# Patient Record
Sex: MALE | Race: WHITE | NOT HISPANIC OR LATINO | Employment: FULL TIME | URBAN - METROPOLITAN AREA
[De-identification: names, ages, dates, MRNs, and addresses within clinical notes are randomized per-mention and may not be internally consistent; named-entity substitution may affect disease eponyms.]

---

## 2017-01-06 ENCOUNTER — TELEPHONE (OUTPATIENT)
Dept: FAMILY MEDICINE | Facility: CLINIC | Age: 47
End: 2017-01-06

## 2017-01-06 NOTE — TELEPHONE ENCOUNTER
Panel Management Review      Patient has the following on his problem list:     Hypertension   Last three blood pressure readings:  BP Readings from Last 3 Encounters:   10/14/16 143/88   10/10/16 126/82   02/25/16 133/76     Blood pressure: Failed    HTN Guidelines:  Age 18-59 BP range:  Less than 140/90  Age 60-85 with Diabetes:  Less than 140/90  Age 60-85 without Diabetes:  less than 150/90      Composite cancer screening  Chart review shows that this patient is due/due soon for the following None  Summary:    Patient is due/failing the following:   BP CHECK    Action needed:   Patient needs nurse only appointment.    Type of outreach:    Sent [employer] message.    Questions for provider review:    None                                                                                   Martha Dexter CMA

## 2017-10-12 ENCOUNTER — TELEPHONE (OUTPATIENT)
Dept: FAMILY MEDICINE | Facility: CLINIC | Age: 47
End: 2017-10-12

## 2017-10-12 NOTE — TELEPHONE ENCOUNTER
Panel Management Review      Patient has the following on his problem list:     Hypertension   Last three blood pressure readings:  BP Readings from Last 3 Encounters:   10/14/16 143/88   10/10/16 126/82   02/25/16 133/76     Blood pressure: FAILED    HTN Guidelines:  Age 18-59 BP range:  Less than 140/90  Age 60-85 with Diabetes:  Less than 140/90  Age 60-85 without Diabetes:  less than 150/90        Composite cancer screening  Chart review shows that this patient is due/due soon for the following None  Summary:    Patient is due/failing the following:   BP CHECK and PHYSICAL    Action needed:   Patient needs office visit for PE and or med check.    Type of outreach:    Sent [employer] message. and Sent letter.    Questions for provider review:    None                                                                                                                                    Martha Dexter, CMA

## 2017-10-12 NOTE — LETTER
October 12, 2017      Mahendra Dixon  [address] [place] [address]      Dear Sony,    In order to ensure we are providing the best quality care, Dr. Ross and I have reviewed your chart and see that you are due for a blood pressure check and or yearly physical    Please call the clinic to set up an office visit at [phone] or [phone].    We greatly appreciate the opportunity to serve you. Thank you for trusting us with your health care.    Sincerely,    ELVIS Douglas M.D.

## 2017-11-15 DIAGNOSIS — I10 BENIGN ESSENTIAL HYPERTENSION: ICD-10-CM

## 2017-11-15 RX ORDER — LISINOPRIL/HYDROCHLOROTHIAZIDE 10-12.5 MG
1 TABLET ORAL DAILY
Qty: 90 TABLET | Refills: 3 | OUTPATIENT
Start: 2017-11-15

## 2018-10-12 ENCOUNTER — RECORDS - HEALTHEAST (OUTPATIENT)
Dept: LAB | Facility: CLINIC | Age: 48
End: 2018-10-12

## 2018-10-12 LAB
ALBUMIN SERPL-MCNC: 4.4 G/DL (ref 3.5–5)
ALP SERPL-CCNC: 90 U/L (ref 45–120)
ALT SERPL W P-5'-P-CCNC: 30 U/L (ref 0–45)
ANION GAP SERPL CALCULATED.3IONS-SCNC: 12 MMOL/L (ref 5–18)
AST SERPL W P-5'-P-CCNC: 23 U/L (ref 0–40)
BILIRUB SERPL-MCNC: 0.5 MG/DL (ref 0–1)
BUN SERPL-MCNC: 16 MG/DL (ref 8–22)
C REACTIVE PROTEIN LHE: 0.3 MG/DL (ref 0–0.8)
CALCIUM SERPL-MCNC: 10.1 MG/DL (ref 8.5–10.5)
CHLORIDE BLD-SCNC: 101 MMOL/L (ref 98–107)
CO2 SERPL-SCNC: 28 MMOL/L (ref 22–31)
CREAT SERPL-MCNC: 0.98 MG/DL (ref 0.7–1.3)
ERYTHROCYTE [SEDIMENTATION RATE] IN BLOOD BY WESTERGREN METHOD: 8 MM/HR (ref 0–15)
GFR SERPL CREATININE-BSD FRML MDRD: >60 ML/MIN/1.73M2
GLUCOSE BLD-MCNC: 81 MG/DL (ref 70–125)
POTASSIUM BLD-SCNC: 4.4 MMOL/L (ref 3.5–5)
PROT SERPL-MCNC: 7.3 G/DL (ref 6–8)
PSA SERPL-MCNC: 0.3 NG/ML (ref 0–2.5)
SODIUM SERPL-SCNC: 141 MMOL/L (ref 136–145)
TSH SERPL DL<=0.005 MIU/L-ACNC: 4.92 UIU/ML (ref 0.3–5)
VIT B12 SERPL-MCNC: 548 PG/ML (ref 213–816)

## 2018-10-13 LAB — BACTERIA SPEC CULT: NO GROWTH

## 2018-11-02 ENCOUNTER — RECORDS - HEALTHEAST (OUTPATIENT)
Dept: LAB | Facility: CLINIC | Age: 48
End: 2018-11-02

## 2018-11-09 LAB
B BURGDOR AB SER-IMP: NORMAL
LYME AB IGG BAND(S): NORMAL
LYME AB IGM BAND(S): NORMAL
LYME IGG BLOT: NEGATIVE
LYME IGM BLOT: NEGATIVE

## 2018-11-26 ENCOUNTER — OFFICE VISIT - HEALTHEAST (OUTPATIENT)
Dept: PODIATRY | Facility: CLINIC | Age: 48
End: 2018-11-26

## 2018-11-26 DIAGNOSIS — G57.61 MORTON'S NEUROMA OF RIGHT FOOT: ICD-10-CM

## 2018-11-26 ASSESSMENT — MIFFLIN-ST. JEOR: SCORE: 1961.69

## 2018-11-29 ENCOUNTER — COMMUNICATION - HEALTHEAST (OUTPATIENT)
Dept: OTHER | Facility: CLINIC | Age: 48
End: 2018-11-29

## 2018-12-19 ENCOUNTER — COMMUNICATION - HEALTHEAST (OUTPATIENT)
Dept: OTHER | Facility: CLINIC | Age: 48
End: 2018-12-19

## 2018-12-26 ENCOUNTER — COMMUNICATION - HEALTHEAST (OUTPATIENT)
Dept: ADMINISTRATIVE | Facility: CLINIC | Age: 48
End: 2018-12-26

## 2019-01-11 ENCOUNTER — RECORDS - HEALTHEAST (OUTPATIENT)
Dept: LAB | Facility: CLINIC | Age: 49
End: 2019-01-11

## 2019-01-11 LAB
ALBUMIN SERPL-MCNC: 4.1 G/DL (ref 3.5–5)
ALP SERPL-CCNC: 81 U/L (ref 45–120)
ALT SERPL W P-5'-P-CCNC: 35 U/L (ref 0–45)
AST SERPL W P-5'-P-CCNC: 23 U/L (ref 0–40)
BILIRUB DIRECT SERPL-MCNC: 0.2 MG/DL
BILIRUB SERPL-MCNC: 0.6 MG/DL (ref 0–1)
LIPASE SERPL-CCNC: 29 U/L (ref 0–52)
PROT SERPL-MCNC: 6.7 G/DL (ref 6–8)

## 2019-10-08 ENCOUNTER — RECORDS - HEALTHEAST (OUTPATIENT)
Dept: LAB | Facility: CLINIC | Age: 49
End: 2019-10-08

## 2019-10-08 LAB
CHOLEST SERPL-MCNC: 177 MG/DL
FASTING STATUS PATIENT QL REPORTED: NORMAL
HDLC SERPL-MCNC: 44 MG/DL
LDLC SERPL CALC-MCNC: 104 MG/DL
TRIGL SERPL-MCNC: 146 MG/DL

## 2020-01-13 ENCOUNTER — RECORDS - HEALTHEAST (OUTPATIENT)
Dept: LAB | Facility: CLINIC | Age: 50
End: 2020-01-13

## 2020-01-13 LAB
ALBUMIN SERPL-MCNC: 4.2 G/DL (ref 3.5–5)
ALP SERPL-CCNC: 93 U/L (ref 45–120)
ALT SERPL W P-5'-P-CCNC: 23 U/L (ref 0–45)
AST SERPL W P-5'-P-CCNC: 19 U/L (ref 0–40)
BILIRUB DIRECT SERPL-MCNC: 0.2 MG/DL
BILIRUB SERPL-MCNC: 0.5 MG/DL (ref 0–1)
PROT SERPL-MCNC: 6.9 G/DL (ref 6–8)

## 2020-02-03 ENCOUNTER — RECORDS - HEALTHEAST (OUTPATIENT)
Dept: ADMINISTRATIVE | Facility: OTHER | Age: 50
End: 2020-02-03

## 2020-02-03 LAB
LAB AP CHARGES (HE HISTORICAL CONVERSION): NORMAL
PATH REPORT.COMMENTS IMP SPEC: NORMAL
PATH REPORT.FINAL DX SPEC: NORMAL
PATH REPORT.GROSS SPEC: NORMAL
PATH REPORT.MICROSCOPIC SPEC OTHER STN: NORMAL
PATH REPORT.RELEVANT HX SPEC: NORMAL
RESULT FLAG (HE HISTORICAL CONVERSION): NORMAL

## 2020-02-08 ENCOUNTER — HEALTH MAINTENANCE LETTER (OUTPATIENT)
Age: 50
End: 2020-02-08

## 2020-02-24 ENCOUNTER — RECORDS - HEALTHEAST (OUTPATIENT)
Dept: LAB | Facility: CLINIC | Age: 50
End: 2020-02-24

## 2020-02-24 LAB
T3FREE SERPL-MCNC: 3.1 PG/ML (ref 1.9–3.9)
T4 FREE SERPL-MCNC: 0.8 NG/DL (ref 0.7–1.8)
TSH SERPL DL<=0.005 MIU/L-ACNC: 3.88 UIU/ML (ref 0.3–5)

## 2020-04-13 ENCOUNTER — RECORDS - HEALTHEAST (OUTPATIENT)
Dept: LAB | Facility: CLINIC | Age: 50
End: 2020-04-13

## 2020-04-13 LAB
ALBUMIN SERPL-MCNC: 4.3 G/DL (ref 3.5–5)
ALP SERPL-CCNC: 96 U/L (ref 45–120)
ALT SERPL W P-5'-P-CCNC: 41 U/L (ref 0–45)
AST SERPL W P-5'-P-CCNC: 25 U/L (ref 0–40)
BILIRUB DIRECT SERPL-MCNC: 0.1 MG/DL
BILIRUB SERPL-MCNC: 0.4 MG/DL (ref 0–1)
LIPASE SERPL-CCNC: 23 U/L (ref 0–52)
PROT SERPL-MCNC: 7.2 G/DL (ref 6–8)

## 2020-11-07 ENCOUNTER — HEALTH MAINTENANCE LETTER (OUTPATIENT)
Age: 50
End: 2020-11-07

## 2021-03-27 ENCOUNTER — HEALTH MAINTENANCE LETTER (OUTPATIENT)
Age: 51
End: 2021-03-27

## 2021-04-02 ENCOUNTER — IMMUNIZATION (OUTPATIENT)
Dept: FAMILY MEDICINE | Facility: CLINIC | Age: 51
End: 2021-04-02
Payer: COMMERCIAL

## 2021-04-02 PROCEDURE — 0011A PR COVID VAC MODERNA 100 MCG/0.5 ML IM: CPT

## 2021-04-02 PROCEDURE — 91301 PR COVID VAC MODERNA 100 MCG/0.5 ML IM: CPT

## 2021-04-30 ENCOUNTER — IMMUNIZATION (OUTPATIENT)
Dept: FAMILY MEDICINE | Facility: CLINIC | Age: 51
End: 2021-04-30
Attending: FAMILY MEDICINE
Payer: COMMERCIAL

## 2021-04-30 PROCEDURE — 91301 PR COVID VAC MODERNA 100 MCG/0.5 ML IM: CPT

## 2021-04-30 PROCEDURE — 0012A PR COVID VAC MODERNA 100 MCG/0.5 ML IM: CPT

## 2021-06-01 ENCOUNTER — RECORDS - HEALTHEAST (OUTPATIENT)
Dept: ADMINISTRATIVE | Facility: CLINIC | Age: 51
End: 2021-06-01

## 2021-06-02 VITALS — BODY MASS INDEX: 36.29 KG/M2 | WEIGHT: 245 LBS | HEIGHT: 69 IN

## 2021-06-21 NOTE — PROGRESS NOTES
Admission History & Physical  Mahendra Dixon, 1970, [id number]    [facility]  Cathie Dahl MD, [phone]    Extended Emergency Contact Information  Primary Emergency Contact: Susana Dixon  Home Phone: [phone]  Relation: Spouse  Secondary Emergency Contact: declined, declined  Relation: Declined     Assessment and Plan:   Assessment: Drake's neuroma right foot  Plan: The patient was given a cortisone injection right foot.  I have also recommended orthotics  Active Problems:    * No active hospital problems. *      Chief Complaint:  Right foot pain     HPI:    Mahendra Dixon is a 48 y.o. old male who presented to the clinic today complaining of a painful right foot.  He has had this pain for several weeks.  The pain is a sharp pain located near the third and fourth toes right foot.  He has the sensation that his socks bunching up under the toes.  The pain is aggravated with weightbearing and ambulation.  He also has pain while resting.  There are no factors which relieve his pain.  He has not had any associated redness or swelling.  He does exercise regularly.  He does not recall any particular trauma to the right foot.  He denies any other previous treatment.  He is currently being tested for Lyme's disease or possible MS.  History is provided by patient    Medical History  Active Ambulatory (Non-Hospital) Problems    Diagnosis     Joint Pain, Localized In The Knee     Complete Tear Of The Anterior Cruciate Ligament Of The Right Knee     Past Medical History:   Diagnosis Date     Hypertension      Plantar fasciitis      Patient Active Problem List    Diagnosis Date Noted     Joint Pain, Localized In The Knee      Complete Tear Of The Anterior Cruciate Ligament Of The Right Knee      Surgical History  He  has no past surgical history on file.   History reviewed. No pertinent surgical history. Social History  Reviewed, and he  reports that  has never smoked. he has never used  "smokeless tobacco.  Social History     Tobacco Use     Smoking status: Never Smoker     Smokeless tobacco: Never Used   Substance Use Topics     Alcohol use: Not on file      Allergies  No Known Allergies Family History  Reviewed, and family history includes No Medical Problems in his brother, father, mother, and sister.   Psychosocial Needs  Social History     Social History Narrative     Not on file     Additional psychosocial needs reviewed per nursing assessment.       Prior to Admission Medications     (Not in a hospital admission)        Review of Systems - Negative     /80   Pulse 60   Ht 5' 9\" (1.753 m)   Wt (!) 245 lb (111.1 kg)   SpO2 97%   BMI 36.18 kg/m      Objective findings: General: The patient is alert and in no acute distress     Integument: Nails bilateral feet are normal length this date.  Skin bilaterally warm and supple.     Vascular: DP and PT pulses are palpable bilaterally.  Capillary refill less than 2 seconds bilateral feet.     Neurologic: Negative clonus, negative Babinski bilateral feet.  There is a mild positive Yaakov sign noted third intermetatarsal space right foot     Musculoskeletal: Range of motion within normal limits bilaterally.  Muscle power +5/5 bilaterally in all compartments.  There is pain on palpation of the third intermetatarsal space right foot.       Assessment: Drake's neuroma right foot     Plan: The patient was given a cortisone injection at the level of the common digital nerve third intermetatarsal space right foot consisting of 1/2 cc of dexamethasone sodium phosphate and 1/2 cc of 2% lidocaine plain.  I have also recommended orthotics.  I informed the patient that if he does not respond to these conservative measures he may require surgical excision of the neuroma.    "

## 2021-09-05 ENCOUNTER — HEALTH MAINTENANCE LETTER (OUTPATIENT)
Age: 51
End: 2021-09-05

## 2021-12-09 ENCOUNTER — LAB REQUISITION (OUTPATIENT)
Dept: LAB | Facility: CLINIC | Age: 51
End: 2021-12-09

## 2021-12-09 DIAGNOSIS — Z12.5 ENCOUNTER FOR SCREENING FOR MALIGNANT NEOPLASM OF PROSTATE: ICD-10-CM

## 2021-12-09 DIAGNOSIS — I10 ESSENTIAL (PRIMARY) HYPERTENSION: ICD-10-CM

## 2021-12-09 LAB
ALBUMIN SERPL-MCNC: 4.2 G/DL (ref 3.5–5)
ALP SERPL-CCNC: 94 U/L (ref 45–120)
ALT SERPL W P-5'-P-CCNC: 48 U/L (ref 0–45)
ANION GAP SERPL CALCULATED.3IONS-SCNC: 13 MMOL/L (ref 5–18)
AST SERPL W P-5'-P-CCNC: 34 U/L (ref 0–40)
BILIRUB SERPL-MCNC: 0.4 MG/DL (ref 0–1)
BUN SERPL-MCNC: 16 MG/DL (ref 8–22)
CALCIUM SERPL-MCNC: 9.8 MG/DL (ref 8.5–10.5)
CHLORIDE BLD-SCNC: 101 MMOL/L (ref 98–107)
CO2 SERPL-SCNC: 27 MMOL/L (ref 22–31)
CREAT SERPL-MCNC: 1.17 MG/DL (ref 0.7–1.3)
GFR SERPL CREATININE-BSD FRML MDRD: 72 ML/MIN/1.73M2
GLUCOSE BLD-MCNC: 102 MG/DL (ref 70–125)
POTASSIUM BLD-SCNC: 4.2 MMOL/L (ref 3.5–5)
PROT SERPL-MCNC: 6.9 G/DL (ref 6–8)
SODIUM SERPL-SCNC: 141 MMOL/L (ref 136–145)

## 2021-12-09 PROCEDURE — 80053 COMPREHEN METABOLIC PANEL: CPT | Performed by: FAMILY MEDICINE

## 2021-12-09 PROCEDURE — G0103 PSA SCREENING: HCPCS | Performed by: FAMILY MEDICINE

## 2021-12-10 LAB — PSA SERPL-MCNC: 0.41 UG/L (ref 0–3.5)

## 2022-04-17 ENCOUNTER — HEALTH MAINTENANCE LETTER (OUTPATIENT)
Age: 52
End: 2022-04-17

## 2022-09-19 ENCOUNTER — OFFICE VISIT (OUTPATIENT)
Dept: NEUROLOGY | Facility: CLINIC | Age: 52
End: 2022-09-19
Payer: COMMERCIAL

## 2022-09-19 VITALS — HEART RATE: 66 BPM | DIASTOLIC BLOOD PRESSURE: 86 MMHG | SYSTOLIC BLOOD PRESSURE: 151 MMHG

## 2022-09-19 DIAGNOSIS — R35.0 URINARY FREQUENCY: ICD-10-CM

## 2022-09-19 DIAGNOSIS — G35 MS (MULTIPLE SCLEROSIS) (H): Primary | ICD-10-CM

## 2022-09-19 PROCEDURE — 99215 OFFICE O/P EST HI 40 MIN: CPT | Performed by: PSYCHIATRY & NEUROLOGY

## 2022-09-19 RX ORDER — TAMSULOSIN HYDROCHLORIDE 0.4 MG/1
CAPSULE ORAL
COMMUNITY
Start: 2022-09-04 | End: 2022-12-14

## 2022-09-19 RX ORDER — GLATIRAMER 40 MG/ML
INJECTION, SOLUTION SUBCUTANEOUS
COMMUNITY
Start: 2022-08-28 | End: 2022-09-19

## 2022-09-19 RX ORDER — GLATIRAMER 40 MG/ML
40 INJECTION, SOLUTION SUBCUTANEOUS
Qty: 12 ML | Refills: 11 | Status: SHIPPED | OUTPATIENT
Start: 2022-09-19 | End: 2023-09-20

## 2022-09-19 NOTE — PATIENT INSTRUCTIONS
Continue copaxone for your MS    Your exam looks great     MRI brain     Continue vitamin D and exercise    Visit with urology     Follow up in 1 year

## 2022-09-19 NOTE — LETTER
9/19/2022         RE: Mahendra Dixon  [address]  [place] [address]        Dear Colleague,    Thank you for referring your patient, Mahendra Dixon, to the [facility]. Please see a copy of my visit note below.    Date of Service: 9/19/2022    [facility] Neurology   MS Clinic Follow-up     Subjective: 52-year-old man with GERD, hypertension, and multiple sclerosis who presents in follow-up.    He does not report any new symptoms related to multiple sclerosis.  Recall that in 2018 he had an episode of MS hug on the right side associated with right leg incoordination when on the treadmill.  The symptoms lasted approximately 1 month and then resolved.    He has been on glatiramer acetate.  He reports tolerating the injections okay.    He does remain active.  He works out 5 days a week.  He does combination of weightlifting and aerobic exercise including elliptical or treadmill work for 30 minutes at a time.  When he is on the treadmill he is able to go 2 to 3 miles over the course of 30 minutes.    He has been struggling with some mid abdominal pain.  This is in the center of the abdominal wall.  It is a tightness sensation.  It occurs intermittently, though he has not been able to identify any clear triggers.  He has undergone a thorough GI work-up without any abnormal pathology.    He does experience urinary frequency.  He is getting up a couple times a night to go to the bathroom.  He experiences urinary frequency during the daytime as well.  He has some difficulty with initiation such that he can take up to 30 seconds for him to initiate his stream.  When the stream does occur it is variable in strength.  He does endorse some incomplete voiding.  He was seen in the urgent care for this and was prescribed Flomax, though has not noticed a clear improvement in symptoms.    He is taking a vitamin D3 supplement, but is uncertain of the dose.    Disease onset: Age 48,  right-sided MS hug with right leg incoordination  Last relapse: Same    Prior DMDs:   Glatiramer acetate 40 mg 3 times per week; 2018 - present      No Known Allergies    Current Outpatient Medications   Medication     glatiramer acetate 40 MG/ML injection     lisinopril-hydrochlorothiazide (PRINZIDE,ZESTORETIC) 10-12.5 MG per tablet     Multiple Vitamin (MULTI-VITAMIN DAILY PO)     Omega-3 Fatty Acids (FISH OIL PO)     omeprazole (PRILOSEC) 20 MG DR capsule     tamsulosin (FLOMAX) 0.4 MG capsule     No current facility-administered medications for this visit.        Past medical, surgical, social and family history was personally reviewed. Pertinent details noted above.     Physical Examination:   BP (!) 151/86 (BP Location: Right arm, Patient Position: Sitting, Cuff Size: Adult Large)   Pulse 66     General: no acute distress  Cranial nerves:   VFFC  PERRL  EOM full w/no BRANDYN   Face symmetric  Hearing intact  No dysarthria   Motor:   Tone is normal   Bulk is normal     R L  Deltoid  5 5  Biceps  5 5  Triceps 5 5  Wrist ext 5 5  Finger ext 5 5  Finger abd 5 5    Hip flexion 5- 5  Knee flexion 5 5  Knee ext 5 5  Ankle d/f 5- 5    Reflexes: 2+ and symmetric throughout, babinski absent bilaterally  Sensory: vibration is minimally reduced in the toes, JPS normal in the toes   Romberg is absent  Coordination: no ataxia or dysmetria  Gait: normal base and stride, tandem gait is intact, able to balance on one foot and hop x 5 bilaterally though a bit more unsteady on the right leg     Tests/Imaging:   CSF + 4 ocb, normal IgG index    KEM virus Ab positive  Vitamin D 55    MRI brain   11/2018 - personal review, low lesion burden, single periventricular, couple small juxtacortical, few small deep white matter, gd-   2/2019 - no new lesions, gd-     MRI cervical spine   11/2018 - personal review, ant c1 lesion, c5-6, large lateral cord lesion c7-t1 gd+   2/2019 - no new lesions, gd-   4/2022 - no new lesions, gd-     MRI  thoracic spine   2/2019 - larger mid lower thoracic spine lesion, smaller upper thoracic and lower thoracic spine lesions, gd-   4/2022 - no new lesions, gd-     Assessment: 52-year-old man with relapsing remitting multiple sclerosis who appears to be clinically and radiologically stable on glatiramer acetate.  I recommended that he continue with glatiramer acetate.  MRI of the brain was not performed with his most recent MRI study, so I have advised pursuing this now.    He is experiencing some abdominal wall pain.  We discussed how this could be related to his bladder symptoms.  I have recommended that he visit with urology to determine if there is a component of neurogenic bladder aside from BPH.  Additionally, this abdominal wall pain could be a neuropathic pain related to his spinal cord lesions.  He did remark that he has undergone a cholecystectomy which was performed in 2020, this could be another source of neuropathic pain.  If symptoms persist and he would like to try a medication for neuropathic pain I would recommend duloxetine.    Plan:   -Continue glatiramer acetate  - MRI brain  - Continue vitamin D supplement  - Urology visit  - Follow-up in 1 year    Note was completed with the assistance of Dragon Fluency software which can often result in accidental word substitutions.     A total of 40 minutes on the date of service were spent in the care of this patient.   Radha Luna MD on 9/19/2022 at 8:48 AM          Again, thank you for allowing me to participate in the care of your patient.        Sincerely,        Radha Luna MD

## 2022-09-19 NOTE — NURSING NOTE
Chief Complaint   Patient presents with     MS     Follow-up. Previous pt at        MALOU Jose on 9/19/2022 at 8:09 AM

## 2022-09-19 NOTE — PROGRESS NOTES
Date of Service: 9/19/2022    [facility] Neurology   MS Clinic Follow-up     Subjective: 52-year-old man with GERD, hypertension, and multiple sclerosis who presents in follow-up.    He does not report any new symptoms related to multiple sclerosis.  Recall that in 2018 he had an episode of MS hug on the right side associated with right leg incoordination when on the treadmill.  The symptoms lasted approximately 1 month and then resolved.    He has been on glatiramer acetate.  He reports tolerating the injections okay.    He does remain active.  He works out 5 days a week.  He does combination of weightlifting and aerobic exercise including elliptical or treadmill work for 30 minutes at a time.  When he is on the treadmill he is able to go 2 to 3 miles over the course of 30 minutes.    He has been struggling with some mid abdominal pain.  This is in the center of the abdominal wall.  It is a tightness sensation.  It occurs intermittently, though he has not been able to identify any clear triggers.  He has undergone a thorough GI work-up without any abnormal pathology.    He does experience urinary frequency.  He is getting up a couple times a night to go to the bathroom.  He experiences urinary frequency during the daytime as well.  He has some difficulty with initiation such that he can take up to 30 seconds for him to initiate his stream.  When the stream does occur it is variable in strength.  He does endorse some incomplete voiding.  He was seen in the urgent care for this and was prescribed Flomax, though has not noticed a clear improvement in symptoms.    He is taking a vitamin D3 supplement, but is uncertain of the dose.    Disease onset: Age 48, right-sided MS hug with right leg incoordination  Last relapse: Same    Prior DMDs:   Glatiramer acetate 40 mg 3 times per week; 2018 - present      No Known Allergies    Current Outpatient Medications   Medication     glatiramer acetate 40 MG/ML injection      lisinopril-hydrochlorothiazide (PRINZIDE,ZESTORETIC) 10-12.5 MG per tablet     Multiple Vitamin (MULTI-VITAMIN DAILY PO)     Omega-3 Fatty Acids (FISH OIL PO)     omeprazole (PRILOSEC) 20 MG DR capsule     tamsulosin (FLOMAX) 0.4 MG capsule     No current facility-administered medications for this visit.        Past medical, surgical, social and family history was personally reviewed. Pertinent details noted above.     Physical Examination:   BP (!) 151/86 (BP Location: Right arm, Patient Position: Sitting, Cuff Size: Adult Large)   Pulse 66     General: no acute distress  Cranial nerves:   VFFC  PERRL  EOM full w/no BRANDYN   Face symmetric  Hearing intact  No dysarthria   Motor:   Tone is normal   Bulk is normal     R L  Deltoid  5 5  Biceps  5 5  Triceps 5 5  Wrist ext 5 5  Finger ext 5 5  Finger abd 5 5    Hip flexion 5- 5  Knee flexion 5 5  Knee ext 5 5  Ankle d/f 5- 5    Reflexes: 2+ and symmetric throughout, babinski absent bilaterally  Sensory: vibration is minimally reduced in the toes, JPS normal in the toes   Romberg is absent  Coordination: no ataxia or dysmetria  Gait: normal base and stride, tandem gait is intact, able to balance on one foot and hop x 5 bilaterally though a bit more unsteady on the right leg     Tests/Imaging:   CSF + 4 ocb, normal IgG index    KEM virus Ab positive  Vitamin D 55    MRI brain   11/2018 - personal review, low lesion burden, single periventricular, couple small juxtacortical, few small deep white matter, gd-   2/2019 - no new lesions, gd-     MRI cervical spine   11/2018 - personal review, ant c1 lesion, c5-6, large lateral cord lesion c7-t1 gd+   2/2019 - no new lesions, gd-   4/2022 - no new lesions, gd-     MRI thoracic spine   2/2019 - larger mid lower thoracic spine lesion, smaller upper thoracic and lower thoracic spine lesions, gd-   4/2022 - no new lesions, gd-     Assessment: 52-year-old man with relapsing remitting multiple sclerosis who appears to be clinically  and radiologically stable on glatiramer acetate.  I recommended that he continue with glatiramer acetate.  MRI of the brain was not performed with his most recent MRI study, so I have advised pursuing this now.    He is experiencing some abdominal wall pain.  We discussed how this could be related to his bladder symptoms.  I have recommended that he visit with urology to determine if there is a component of neurogenic bladder aside from BPH.  Additionally, this abdominal wall pain could be a neuropathic pain related to his spinal cord lesions.  He did remark that he has undergone a cholecystectomy which was performed in 2020, this could be another source of neuropathic pain.  If symptoms persist and he would like to try a medication for neuropathic pain I would recommend duloxetine.    Plan:   -Continue glatiramer acetate  - MRI brain  - Continue vitamin D supplement  - Urology visit  - Follow-up in 1 year    Note was completed with the assistance of Dragon Fluency software which can often result in accidental word substitutions.     A total of 40 minutes on the date of service were spent in the care of this patient.   Radha Luna MD on 9/19/2022 at 8:48 AM

## 2022-09-27 ENCOUNTER — LAB REQUISITION (OUTPATIENT)
Dept: LAB | Facility: CLINIC | Age: 52
End: 2022-09-27

## 2022-09-27 DIAGNOSIS — E03.9 HYPOTHYROIDISM, UNSPECIFIED: ICD-10-CM

## 2022-09-27 DIAGNOSIS — I10 ESSENTIAL (PRIMARY) HYPERTENSION: ICD-10-CM

## 2022-09-27 DIAGNOSIS — Z13.6 ENCOUNTER FOR SCREENING FOR CARDIOVASCULAR DISORDERS: ICD-10-CM

## 2022-09-27 DIAGNOSIS — N40.1 BENIGN PROSTATIC HYPERPLASIA WITH LOWER URINARY TRACT SYMPTOMS: ICD-10-CM

## 2022-09-27 LAB
ALBUMIN SERPL BCG-MCNC: 4.6 G/DL (ref 3.5–5.2)
ALP SERPL-CCNC: 81 U/L (ref 40–129)
ALT SERPL W P-5'-P-CCNC: 30 U/L (ref 10–50)
ANION GAP SERPL CALCULATED.3IONS-SCNC: 8 MMOL/L (ref 7–15)
AST SERPL W P-5'-P-CCNC: 24 U/L (ref 10–50)
BILIRUB SERPL-MCNC: 0.3 MG/DL
BUN SERPL-MCNC: 24.5 MG/DL (ref 6–20)
CALCIUM SERPL-MCNC: 9.4 MG/DL (ref 8.6–10)
CHLORIDE SERPL-SCNC: 101 MMOL/L (ref 98–107)
CHOLEST SERPL-MCNC: 171 MG/DL
CREAT SERPL-MCNC: 1.18 MG/DL (ref 0.67–1.17)
DEPRECATED HCO3 PLAS-SCNC: 31 MMOL/L (ref 22–29)
GFR SERPL CREATININE-BSD FRML MDRD: 75 ML/MIN/1.73M2
GLUCOSE SERPL-MCNC: 106 MG/DL (ref 70–99)
HDLC SERPL-MCNC: 48 MG/DL
LDLC SERPL CALC-MCNC: 107 MG/DL
NONHDLC SERPL-MCNC: 123 MG/DL
POTASSIUM SERPL-SCNC: 4.2 MMOL/L (ref 3.4–5.3)
PROT SERPL-MCNC: 6.5 G/DL (ref 6.4–8.3)
PSA SERPL-MCNC: 0.45 NG/ML (ref 0–3.5)
SODIUM SERPL-SCNC: 140 MMOL/L (ref 136–145)
TRIGL SERPL-MCNC: 82 MG/DL
TSH SERPL DL<=0.005 MIU/L-ACNC: 4.28 UIU/ML (ref 0.3–4.2)

## 2022-09-27 PROCEDURE — G0103 PSA SCREENING: HCPCS | Performed by: FAMILY MEDICINE

## 2022-09-27 PROCEDURE — 84443 ASSAY THYROID STIM HORMONE: CPT | Performed by: FAMILY MEDICINE

## 2022-09-27 PROCEDURE — 80061 LIPID PANEL: CPT | Performed by: FAMILY MEDICINE

## 2022-09-27 PROCEDURE — 80053 COMPREHEN METABOLIC PANEL: CPT | Performed by: FAMILY MEDICINE

## 2022-10-07 ENCOUNTER — HOSPITAL ENCOUNTER (OUTPATIENT)
Dept: MRI IMAGING | Facility: HOSPITAL | Age: 52
Discharge: HOME | End: 2022-10-07
Attending: PSYCHIATRY & NEUROLOGY | Admitting: PSYCHIATRY & NEUROLOGY
Payer: COMMERCIAL

## 2022-10-07 DIAGNOSIS — G35 MS (MULTIPLE SCLEROSIS) (H): ICD-10-CM

## 2022-10-07 PROCEDURE — A9585 GADOBUTROL INJECTION: HCPCS | Performed by: PSYCHIATRY & NEUROLOGY

## 2022-10-07 PROCEDURE — 70553 MRI BRAIN STEM W/O & W/DYE: CPT

## 2022-10-07 PROCEDURE — 255N000002 HC RX 255 OP 636: Performed by: PSYCHIATRY & NEUROLOGY

## 2022-10-07 RX ORDER — GADOBUTROL 604.72 MG/ML
0.1 INJECTION INTRAVENOUS ONCE
Status: COMPLETED | OUTPATIENT
Start: 2022-10-07 | End: 2022-10-07

## 2022-10-07 RX ADMIN — GADOBUTROL 11 ML: 604.72 INJECTION INTRAVENOUS at 13:23

## 2022-12-06 ENCOUNTER — LAB REQUISITION (OUTPATIENT)
Dept: LAB | Facility: CLINIC | Age: 52
End: 2022-12-06
Payer: COMMERCIAL

## 2022-12-06 LAB
APPEARANCE FLD: ABNORMAL
CELL COUNT BODY FLUID SOURCE: ABNORMAL
COLOR FLD: ABNORMAL
CRYSTALS SNV MICRO: NORMAL
WBC # FLD AUTO: 10 /UL

## 2022-12-06 PROCEDURE — 87075 CULTR BACTERIA EXCEPT BLOOD: CPT | Mod: ORL

## 2022-12-06 PROCEDURE — 89050 BODY FLUID CELL COUNT: CPT | Mod: ORL

## 2022-12-06 PROCEDURE — 89060 EXAM SYNOVIAL FLUID CRYSTALS: CPT | Mod: ORL

## 2022-12-06 PROCEDURE — 87070 CULTURE OTHR SPECIMN AEROBIC: CPT | Mod: ORL

## 2022-12-11 LAB — BACTERIA SNV CULT: NO GROWTH

## 2022-12-14 ENCOUNTER — OFFICE VISIT (OUTPATIENT)
Dept: UROLOGY | Facility: CLINIC | Age: 52
End: 2022-12-14
Attending: PSYCHIATRY & NEUROLOGY
Payer: COMMERCIAL

## 2022-12-14 VITALS — DIASTOLIC BLOOD PRESSURE: 97 MMHG | SYSTOLIC BLOOD PRESSURE: 163 MMHG | HEART RATE: 71 BPM | OXYGEN SATURATION: 99 %

## 2022-12-14 DIAGNOSIS — G35 MS (MULTIPLE SCLEROSIS) (H): ICD-10-CM

## 2022-12-14 DIAGNOSIS — R35.0 URINARY FREQUENCY: ICD-10-CM

## 2022-12-14 LAB
ALBUMIN UR-MCNC: NEGATIVE MG/DL
APPEARANCE UR: CLEAR
BILIRUB UR QL STRIP: NEGATIVE
COLOR UR AUTO: YELLOW
GLUCOSE UR STRIP-MCNC: NEGATIVE MG/DL
HGB UR QL STRIP: NEGATIVE
KETONES UR STRIP-MCNC: NEGATIVE MG/DL
LEUKOCYTE ESTERASE UR QL STRIP: NEGATIVE
NITRATE UR QL: NEGATIVE
PH UR STRIP: 6 [PH] (ref 5–7)
RBC #/AREA URNS AUTO: NORMAL /HPF
SP GR UR STRIP: 1.01 (ref 1–1.03)
UROBILINOGEN UR STRIP-ACNC: 0.2 E.U./DL
WBC #/AREA URNS AUTO: NORMAL /HPF

## 2022-12-14 PROCEDURE — 87086 URINE CULTURE/COLONY COUNT: CPT | Performed by: STUDENT IN AN ORGANIZED HEALTH CARE EDUCATION/TRAINING PROGRAM

## 2022-12-14 PROCEDURE — 99214 OFFICE O/P EST MOD 30 MIN: CPT | Mod: 25 | Performed by: STUDENT IN AN ORGANIZED HEALTH CARE EDUCATION/TRAINING PROGRAM

## 2022-12-14 PROCEDURE — 81001 URINALYSIS AUTO W/SCOPE: CPT | Performed by: STUDENT IN AN ORGANIZED HEALTH CARE EDUCATION/TRAINING PROGRAM

## 2022-12-14 PROCEDURE — 51798 US URINE CAPACITY MEASURE: CPT | Performed by: STUDENT IN AN ORGANIZED HEALTH CARE EDUCATION/TRAINING PROGRAM

## 2022-12-14 RX ORDER — ALFUZOSIN HYDROCHLORIDE 10 MG/1
10 TABLET, EXTENDED RELEASE ORAL DAILY
Qty: 30 TABLET | Refills: 4 | Status: SHIPPED | OUTPATIENT
Start: 2022-12-14 | End: 2023-01-10

## 2022-12-14 NOTE — PROGRESS NOTES
Chief Complaint:   Urinary urgency         History of Present Illness:   Mahendra Dixon is a 52 year old male with a history of HTN and multiple sclerosis who presents for evaluation of urinary urgency.     Today, he reports nocturia x 2-3, urinary urgency, and suprapubic pressure. He also reports some weak stream, hesitancy, and sensation of incomplete bladder emptying.     He takes tamsulosin 0.4 mg daily, but did not notice a great improvement in his symptoms with initiation of this medication.     His PSA was 0.45 ng/mL on 9/27/2022.  He was recently diagnosed with sleep apnea and is awaiting a CPAP.    He does report constipation. He will have a bowel movement usually every 2 to 3 days.  He does often have to strain to defecate.  He drinks 96 ounces of water a day, has increased his fiber intake, and is taking a fiber supplement.         Past Medical History:     Past Medical History:   Diagnosis Date     NO ACTIVE PROBLEMS             Past Surgical History:     Past Surgical History:   Procedure Laterality Date     ARTHROSCOPIC RECONSTRUCTION ANTERIOR CRUCIATE LIGAMENT  Oct 2013    acl     LAPAROSCOPIC APPENDECTOMY  2011            Medications     Current Outpatient Medications   Medication     tamsulosin (FLOMAX) 0.4 MG capsule     glatiramer acetate 40 MG/ML injection     lisinopril-hydrochlorothiazide (PRINZIDE,ZESTORETIC) 10-12.5 MG per tablet     Multiple Vitamin (MULTI-VITAMIN DAILY PO)     Omega-3 Fatty Acids (FISH OIL PO)     omeprazole (PRILOSEC) 20 MG DR capsule     No current facility-administered medications for this visit.            Allergies:   Patient has no known allergies.         Review of Systems:  From intake questionnaire   Negative 14 system review except as noted on HPI, nurse's note.         Physical Exam:   Patient is a 52 year old  male   Vitals: Blood pressure (!) 163/97, pulse 71, SpO2 99 %.  General Appearance Adult: Alert, no acute distress, oriented.  Lungs:  Non-labored breathing.  Heart: No obvious jugular venous distension present.  Neuro: Alert, oriented, speech and mentation normal    PVR: 154 mL      Labs and Pathology:    I personally reviewed all applicable laboratory data and went over findings with patient  Significant for:     BMP RESULTS:  Recent Labs   Lab Test 09/27/22  1114 12/09/21  1554 10/12/18  1348 10/10/16  1134 02/15/16  1140 02/08/16  0000    141 141 138 139  --    POTASSIUM 4.2 4.2 4.4 3.7 3.5 3.6   CHLORIDE 101 101 101 103 104  --    CO2 31* 27 28 31 28  --    ANIONGAP 8 13 12 4 7  --    * 102 81 104* 113* 120*   BUN 24.5* 16 16 14 12  --    CR 1.18* 1.17 0.98 0.98 0.90 0.90   GFRESTIMATED 75 72 >60 82 >90  Non  GFR Calc   >60   GFRESTBLACK  --   --  >60 >90   GFR Calc   >90   GFR Calc   >60   LESA 9.4 9.8 10.1 9.2 8.7  --        UA RESULTS:   Recent Labs   Lab Test 08/04/15  1518   SG 1.020   URINEPH 7.0   NITRITE Negative   RBCU O - 2   WBCU O - 2       PSA RESULTS  Prostate Specific Antigen Screen   Date Value Ref Range Status   09/27/2022 0.45 0.00 - 3.50 ng/mL Final   12/09/2021 0.41 0.00 - 3.50 ug/L Final   10/12/2018 0.3 0.0 - 2.5 ng/mL Final            Assessment and Plan:     Assessment: 52 year old male with MS who presents with nocturia, urinary urgency, and suprapubic pressure.  He also reports weak stream, urinary hesitancy, and sensation of incomplete bladder emptying.    He takes tamsulosin 0.4 mg daily, which did help his urinary symptoms initially, though he is not sure it is helpful.  He does report constipation and is taking a fiber supplement.    We discussed possible causes for his urinary symptoms including BPH and neurogenic bladder secondary to MS.  We also discussed how constipation can impact urinary symptoms.    We discussed possible treatment options including switching from tamsulosin to alfuzosin versus starting an anticholinergic medication.  The patient  would like to try alfuzosin for a few weeks to see if that will make a change in his urinary symptoms.  We also discussed that his constipation may require a more aggressive bowel regimen with either a stool softener or MiraLAX.  If his urinary symptoms do not respond to alfuzosin we may consider further evaluation with urodynamics or cystoscopy.    Plan:  1.  Stop tamsulosin.  Start alfuzosin 10 mg daily.  2.  Patient will send me a message in a few weeks with an update on his urinary symptoms and we will determine next steps.    KHUSHBU VILLASEÑOR PA-C  Department of Urology

## 2022-12-14 NOTE — NURSING NOTE
"Initial BP (!) 163/97 (BP Location: Left arm, Patient Position: Chair, Cuff Size: Adult Large)   Pulse 71   SpO2 99%  Estimated body mass index is 36.18 kg/m  as calculated from the following:    Height as of 11/26/18: 1.753 m (5' 9\").    Weight as of 11/26/18: 111.1 kg (245 lb). .    Active order to obtain bladder scan? Yes   Name of ordering provider:  Lillian Naidu  Bladder scan preformed post void Yes.  Bladder scan reveled 154ML  Provider notified?  Yes    Floridalma Butcher CMA          "

## 2022-12-16 LAB — BACTERIA UR CULT: NO GROWTH

## 2022-12-21 LAB — BACTERIA SNV CULT: NORMAL

## 2023-01-10 DIAGNOSIS — R35.0 URINARY FREQUENCY: ICD-10-CM

## 2023-01-10 RX ORDER — ALFUZOSIN HYDROCHLORIDE 10 MG/1
10 TABLET, EXTENDED RELEASE ORAL DAILY
Qty: 30 TABLET | Refills: 0 | Status: SHIPPED | OUTPATIENT
Start: 2023-01-10 | End: 2023-01-25

## 2023-01-10 NOTE — TELEPHONE ENCOUNTER
Last Written Prescription Date:    Last Fill Quantity: ,  # refills:    Last office visit: 12/14/2022 with prescribing provider:     Future Office Visit:        Requested Prescriptions   Pending Prescriptions Disp Refills     alfuzosin ER (UROXATRAL) 10 MG 24 hr tablet 30 tablet 4     Sig: Take 1 tablet (10 mg) by mouth daily       There is no refill protocol information for this order

## 2023-01-12 NOTE — TELEPHONE ENCOUNTER
Fax received from [facility] pharmacy. They no longer do mail service for patient.  Thank you,    Nga Estrella  [facility]

## 2023-01-25 ENCOUNTER — OFFICE VISIT (OUTPATIENT)
Dept: UROLOGY | Facility: CLINIC | Age: 53
End: 2023-01-25
Payer: COMMERCIAL

## 2023-01-25 VITALS — HEART RATE: 56 BPM | OXYGEN SATURATION: 99 % | DIASTOLIC BLOOD PRESSURE: 101 MMHG | SYSTOLIC BLOOD PRESSURE: 170 MMHG

## 2023-01-25 DIAGNOSIS — R35.0 URINARY FREQUENCY: Primary | ICD-10-CM

## 2023-01-25 PROCEDURE — 99213 OFFICE O/P EST LOW 20 MIN: CPT | Mod: 25 | Performed by: STUDENT IN AN ORGANIZED HEALTH CARE EDUCATION/TRAINING PROGRAM

## 2023-01-25 PROCEDURE — 51798 US URINE CAPACITY MEASURE: CPT | Performed by: STUDENT IN AN ORGANIZED HEALTH CARE EDUCATION/TRAINING PROGRAM

## 2023-01-25 RX ORDER — TAMSULOSIN HYDROCHLORIDE 0.4 MG/1
0.4 CAPSULE ORAL DAILY
COMMUNITY

## 2023-01-25 NOTE — PROGRESS NOTES
UROLOGY FOLLOW-UP NOTE          Chief Complaint:   Today I had the pleasure of seeing Mr. Mahendra Dixon in follow-up for a chief complaint of urinary urgency.          Interval Update   Mahendra Dixon is a very pleasant 52 year old male with a history of HTN and multiple sclerosis.     Brief History: Mr. Mahendra Dixon was seen on 12/14/2022 for nocturia x 2-3, urinary urgency, suprapubic pressure, weak stream, and hesitancy. He was taking tamsulosin at the time. The patient elected for a trial of alfuzosin instead. He did report constipation and was taking a fiber supplement.     Today's notes: He notes worsening symptoms with alfuzosin and has since switched back to tamsulosin. He continues to note some abdominal discomfort. This is in his mid-upper abdomen. He had a CT abdomen on 8/31/2022 and has followed with [facility].     He reports improved nocturia x 1 and double voiding. The tamsulosin seems to be working well.          Physical Exam:   Patient is a 52 year old  male   Vitals: Blood pressure (!) 170/101, pulse 56, SpO2 99 %.  General: Alert and oriented x 3, no acute distress.  Respiratory: Non-labored breathing.  Cardiac: Regular rate.    PVR: 134 mL        Labs and Pathology:    I personally reviewed all applicable laboratory data and went over findings with patient  Significant for:    BMP RESULTS:  Recent Labs   Lab Test 09/27/22  1114 12/09/21  1554 10/12/18  1348 10/10/16  1134 02/15/16  1140 02/08/16  0000    141 141 138 139  --    POTASSIUM 4.2 4.2 4.4 3.7 3.5 3.6   CHLORIDE 101 101 101 103 104  --    CO2 31* 27 28 31 28  --    ANIONGAP 8 13 12 4 7  --    * 102 81 104* 113* 120*   BUN 24.5* 16 16 14 12  --    CR 1.18* 1.17 0.98 0.98 0.90 0.90   GFRESTIMATED 75 72 >60 82 >90  Non  GFR Calc   >60   GFRESTBLACK  --   --  >60 >90   GFR Calc   >90   GFR Calc   >60   LESA 9.4 9.8 10.1 9.2 8.7  --        UA RESULTS:   Recent Labs    Lab Test 12/14/22  1523 08/04/15  1518   SG 1.010 1.020   URINEPH 6.0 7.0   NITRITE Negative Negative   RBCU None Seen O - 2   WBCU None Seen O - 2       PSA RESULTS  Prostate Specific Antigen Screen   Date Value Ref Range Status   09/27/2022 0.45 0.00 - 3.50 ng/mL Final   12/09/2021 0.41 0.00 - 3.50 ug/L Final   10/12/2018 0.3 0.0 - 2.5 ng/mL Final            Assessment/Plan   52 year old male seen in follow up for urinary frequency and urgency. He was taking tamsulosin at the time of urology consult six weeks ago. He was switched to alfuzosin and did not think it was helpful. He is now taking tamsulosin, which has been helpful for management of his symptoms. He denies urgency. He reports nocturia x 1 and some double voiding.     Plan:  1. Continue tamsulosin 0.4 mg daily.   2. Follow up with urology as needed.            Past Medical History:     Past Medical History:   Diagnosis Date     NO ACTIVE PROBLEMS             Past Surgical History:     Past Surgical History:   Procedure Laterality Date     ARTHROSCOPIC RECONSTRUCTION ANTERIOR CRUCIATE LIGAMENT  Oct 2013    acl     LAPAROSCOPIC APPENDECTOMY  2011            Medications     Current Outpatient Medications   Medication     tamsulosin (FLOMAX) 0.4 MG capsule     glatiramer acetate 40 MG/ML injection     lisinopril-hydrochlorothiazide (PRINZIDE,ZESTORETIC) 10-12.5 MG per tablet     Multiple Vitamin (MULTI-VITAMIN DAILY PO)     Omega-3 Fatty Acids (FISH OIL PO)     omeprazole (PRILOSEC) 20 MG DR capsule     No current facility-administered medications for this visit.            Family History:     Family History   Problem Relation Age of Onset     Family History Negative Mother      Family History Negative Father      Family History Negative Maternal Grandmother      ROLY. Maternal Grandfather         MI-passed away >age 50     Respiratory Maternal Grandfather         smoked      Family History Negative Paternal Grandmother      Family History Negative  Paternal Grandfather      No Known Problems Mother      No Known Problems Father      No Known Problems Sister      No Known Problems Brother             Social History:     Social History     Socioeconomic History     Marital status:      Spouse name: Not on file     Number of children: Not on file     Years of education: Not on file     Highest education level: Not on file   Occupational History     Not on file   Tobacco Use     Smoking status: Never     Smokeless tobacco: Never   Substance and Sexual Activity     Alcohol use: No     Drug use: No     Sexual activity: Yes     Partners: Female   Other Topics Concern     Parent/sibling w/ CABG, MI or angioplasty before 65F 55M? No   Social History Narrative     Not on file     Social Determinants of Health     Financial Resource Strain: Not on file   Food Insecurity: Not on file   Transportation Needs: Not on file   Physical Activity: Not on file   Stress: Not on file   Social Connections: Not on file   Intimate Partner Violence: Not on file   Housing Stability: Not on file            Allergies:   Patient has no known allergies.         Review of Systems:  From intake questionnaire   Negative 14 system review except as noted on HPI, nurse's note.        KHUSHBU VILLASEÑOR PA-C  Department of Urology

## 2023-01-25 NOTE — NURSING NOTE
"Initial BP (!) 170/101 (BP Location: Left arm, Patient Position: Chair, Cuff Size: Adult Large)   Pulse 56   SpO2 99%  Estimated body mass index is 36.18 kg/m  as calculated from the following:    Height as of 11/26/18: 1.753 m (5' 9\").    Weight as of 11/26/18: 111.1 kg (245 lb). .    Active order to obtain bladder scan? Yes   Name of ordering provider:  Lillian Naidu  Bladder scan preformed post void Yes.  Bladder scan reveled 134ML  Provider notified?  Yes    Floridalma Butcher CMA          "

## 2023-03-16 ENCOUNTER — LAB REQUISITION (OUTPATIENT)
Dept: LAB | Facility: CLINIC | Age: 53
End: 2023-03-16

## 2023-03-16 DIAGNOSIS — E03.9 HYPOTHYROIDISM, UNSPECIFIED: ICD-10-CM

## 2023-03-16 DIAGNOSIS — I10 ESSENTIAL (PRIMARY) HYPERTENSION: ICD-10-CM

## 2023-03-16 LAB
ALBUMIN SERPL BCG-MCNC: 4.7 G/DL (ref 3.5–5.2)
ALP SERPL-CCNC: 98 U/L (ref 40–129)
ALT SERPL W P-5'-P-CCNC: 35 U/L (ref 10–50)
ANION GAP SERPL CALCULATED.3IONS-SCNC: 14 MMOL/L (ref 7–15)
AST SERPL W P-5'-P-CCNC: 32 U/L (ref 10–50)
BILIRUB SERPL-MCNC: 0.3 MG/DL
BUN SERPL-MCNC: 18.1 MG/DL (ref 6–20)
CALCIUM SERPL-MCNC: 9.8 MG/DL (ref 8.6–10)
CHLORIDE SERPL-SCNC: 102 MMOL/L (ref 98–107)
CREAT SERPL-MCNC: 1.14 MG/DL (ref 0.67–1.17)
DEPRECATED HCO3 PLAS-SCNC: 26 MMOL/L (ref 22–29)
GFR SERPL CREATININE-BSD FRML MDRD: 77 ML/MIN/1.73M2
GLUCOSE SERPL-MCNC: 96 MG/DL (ref 70–99)
POTASSIUM SERPL-SCNC: 4.1 MMOL/L (ref 3.4–5.3)
PROT SERPL-MCNC: 7.1 G/DL (ref 6.4–8.3)
SODIUM SERPL-SCNC: 142 MMOL/L (ref 136–145)
TSH SERPL DL<=0.005 MIU/L-ACNC: 3.43 UIU/ML (ref 0.3–4.2)

## 2023-03-16 PROCEDURE — 80053 COMPREHEN METABOLIC PANEL: CPT | Performed by: FAMILY MEDICINE

## 2023-03-16 PROCEDURE — 84443 ASSAY THYROID STIM HORMONE: CPT | Performed by: FAMILY MEDICINE

## 2023-06-01 ENCOUNTER — HEALTH MAINTENANCE LETTER (OUTPATIENT)
Age: 53
End: 2023-06-01

## 2023-06-20 ENCOUNTER — LAB REQUISITION (OUTPATIENT)
Dept: LAB | Facility: CLINIC | Age: 53
End: 2023-06-20

## 2023-06-20 DIAGNOSIS — R10.84 GENERALIZED ABDOMINAL PAIN: ICD-10-CM

## 2023-06-20 DIAGNOSIS — R63.5 ABNORMAL WEIGHT GAIN: ICD-10-CM

## 2023-06-20 DIAGNOSIS — R53.82 CHRONIC FATIGUE, UNSPECIFIED: ICD-10-CM

## 2023-06-20 LAB
BASOPHILS # BLD AUTO: 0.1 10E3/UL (ref 0–0.2)
BASOPHILS NFR BLD AUTO: 1 %
CORTIS SERPL-MCNC: 6.1 UG/DL
CRP SERPL-MCNC: 3.76 MG/L
EOSINOPHIL # BLD AUTO: 0.1 10E3/UL (ref 0–0.7)
EOSINOPHIL NFR BLD AUTO: 2 %
ERYTHROCYTE [DISTWIDTH] IN BLOOD BY AUTOMATED COUNT: 12.6 % (ref 10–15)
HCT VFR BLD AUTO: 41.2 % (ref 40–53)
HGB BLD-MCNC: 13.8 G/DL (ref 13.3–17.7)
IMM GRANULOCYTES # BLD: 0 10E3/UL
IMM GRANULOCYTES NFR BLD: 1 %
LIPASE SERPL-CCNC: 36 U/L (ref 13–60)
LYMPHOCYTES # BLD AUTO: 1.8 10E3/UL (ref 0.8–5.3)
LYMPHOCYTES NFR BLD AUTO: 33 %
MCH RBC QN AUTO: 31.2 PG (ref 26.5–33)
MCHC RBC AUTO-ENTMCNC: 33.5 G/DL (ref 31.5–36.5)
MCV RBC AUTO: 93 FL (ref 78–100)
MONOCYTES # BLD AUTO: 0.4 10E3/UL (ref 0–1.3)
MONOCYTES NFR BLD AUTO: 7 %
NEUTROPHILS # BLD AUTO: 3.1 10E3/UL (ref 1.6–8.3)
NEUTROPHILS NFR BLD AUTO: 56 %
NRBC # BLD AUTO: 0 10E3/UL
NRBC BLD AUTO-RTO: 0 /100
PLATELET # BLD AUTO: 273 10E3/UL (ref 150–450)
PROLACTIN SERPL 3RD IS-MCNC: 6 NG/ML (ref 4–15)
RBC # BLD AUTO: 4.43 10E6/UL (ref 4.4–5.9)
SHBG SERPL-SCNC: 33 NMOL/L (ref 11–80)
TSH SERPL DL<=0.005 MIU/L-ACNC: 2.72 UIU/ML (ref 0.3–4.2)
WBC # BLD AUTO: 5.4 10E3/UL (ref 4–11)

## 2023-06-20 PROCEDURE — 84146 ASSAY OF PROLACTIN: CPT | Performed by: FAMILY MEDICINE

## 2023-06-20 PROCEDURE — 84270 ASSAY OF SEX HORMONE GLOBUL: CPT | Performed by: FAMILY MEDICINE

## 2023-06-20 PROCEDURE — 84443 ASSAY THYROID STIM HORMONE: CPT | Performed by: FAMILY MEDICINE

## 2023-06-20 PROCEDURE — 82533 TOTAL CORTISOL: CPT | Performed by: FAMILY MEDICINE

## 2023-06-20 PROCEDURE — 86140 C-REACTIVE PROTEIN: CPT | Performed by: FAMILY MEDICINE

## 2023-06-20 PROCEDURE — 85025 COMPLETE CBC W/AUTO DIFF WBC: CPT | Performed by: FAMILY MEDICINE

## 2023-06-20 PROCEDURE — 84403 ASSAY OF TOTAL TESTOSTERONE: CPT | Performed by: FAMILY MEDICINE

## 2023-06-20 PROCEDURE — 83690 ASSAY OF LIPASE: CPT | Performed by: FAMILY MEDICINE

## 2023-06-26 LAB
TESTOST FREE SERPL-MCNC: 5.26 NG/DL
TESTOST SERPL-MCNC: 270 NG/DL (ref 240–950)

## 2023-10-30 ENCOUNTER — OFFICE VISIT (OUTPATIENT)
Dept: NEUROLOGY | Facility: CLINIC | Age: 53
End: 2023-10-30
Payer: COMMERCIAL

## 2023-10-30 VITALS — HEART RATE: 65 BPM | SYSTOLIC BLOOD PRESSURE: 166 MMHG | DIASTOLIC BLOOD PRESSURE: 101 MMHG

## 2023-10-30 DIAGNOSIS — G35 MS (MULTIPLE SCLEROSIS) (H): Primary | ICD-10-CM

## 2023-10-30 PROCEDURE — 99214 OFFICE O/P EST MOD 30 MIN: CPT | Performed by: PSYCHIATRY & NEUROLOGY

## 2023-10-30 NOTE — PROGRESS NOTES
Date of Service: 10/30/2022     [facility] Neurology   MS Clinic Follow-up      Subjective: 53-year-old man with GERD, hiatal hernia, hypertension, and multiple sclerosis who presents for follow-up.     He does not report any new symptoms related to multiple sclerosis.  Recall that in 2018 he had an episode of MS hug on the right side associated with right leg incoordination when on the treadmill.  The symptoms lasted approximately 1 month and then resolved.     He has been on glatiramer acetate.  He reports tolerating the injections okay.     He endorses no vision changes (in fact, his vision has actually improved), weakness, or sensory deficits.      He does remain active.  He works out 3-4 days a week.  He does combination of weightlifting and aerobic exercise including elliptical or treadmill work for 30 minutes at a time.  When he is on the treadmill he is able to go 2 to 3 miles over the course of 30 minutes. He is currently working with PT regarding improving hip strength in the setting of arthritis due to years of playing hockey. He works full time for the [employer].     He has been struggling with some mid abdominal pain.  This is in the center of the abdominal wall.  It is a tightness sensation.  It occurs intermittently, though he has not been able to identify any clear triggers.  He has undergone a thorough GI work-up without any abnormal pathology. He again recently presented to the ED for similar symptoms and CT incidentally revealed a hypodensity in the R kidney that was not visualized on ultrasound;  thus, follow up CT/MRI of the R kidney was recommended, which was reportedly determined to be a benign cyst.     Patient also recently had a stress test performed for chest pain, which was unremarkable.     Since last visit, the patient has seen urology for symptoms of urinary frequency and urgency. He was prescribed tamsulosin, which has provided some relief. He also takes tadalafil.      He is taking a vitamin D3 supplement, but is uncertain of the dose.     Disease onset: Age 48, right-sided MS hug with right leg incoordination  Last relapse: Same     Prior DMDs:   Glatiramer acetate 40 mg 3 times per week; 2018 - present        No Known Allergies         Current Outpatient Medications   Medication    glatiramer acetate 40 MG/ML injection    lisinopril-hydrochlorothiazide (PRINZIDE,ZESTORETIC) 10-12.5 MG per tablet    Multiple Vitamin (MULTI-VITAMIN DAILY PO)    Omega-3 Fatty Acids (FISH OIL PO)    omeprazole (PRILOSEC) 20 MG DR capsule    tamsulosin (FLOMAX) 0.4 MG capsule      No current facility-administered medications for this visit.         Past medical, surgical, social and family history was personally reviewed. Pertinent details noted above.      Physical Examination:   BP (!) 151/86 (BP Location: Right arm, Patient Position: Sitting, Cuff Size: Adult Large)   Pulse 66      General: no acute distress, completely oriented,   Cranial nerves:   VFFC  PERRL  EOM full w/no BRANDYN   Face symmetric  Hearing intact  No dysarthria   Motor:   Tone is normal   Bulk is normal                           R          L  Deltoid             5          5  Biceps             5          5  Triceps            5          5  Wrist ext          5          5  Finger ext        5          5  Finger abd       5          5     Hip flexion       5-         5  Knee flexion    5          5  Knee ext          5          5  Ankle d/f          5          5     Reflexes: 2+ and symmetric throughout, toes mute  Sensory: vibration is intact throughout (10s in toes and fingers), light touch intact without sensory extinction    Romberg is absent  Coordination: no ataxia or dysmetria with FNF or heel to shin  Gait: normal base and stride, tandem gait is intact, has difficulty balancing on one foot and hop x 5 bilaterally, a bit more unsteady on the right leg      Tests/Imaging:   CSF + 4 ocb, normal IgG index    KEM virus Ab  positive  Vitamin D 55    MRI brain   11/2018 - personal review, low lesion burden, single periventricular, couple small juxtacortical, few small deep white matter, gd-   2/2019 - no new lesions, gd-   10/2023 - no new lesions, gd-    MRI cervical spine   11/2018 - personal review, ant c1 lesion, c5-6, large lateral cord lesion c7-t1 gd+   2/2019 - no new lesions, gd-   4/2022 - no new lesions, gd-     MRI thoracic spine   2/2019 - larger mid lower thoracic spine lesion, smaller upper thoracic and lower thoracic spine lesions, gd-   4/2022 - no new lesions, gd-        Assessment: 52-year-old man with relapsing remitting multiple sclerosis who appears to be clinically and radiologically stable on glatiramer acetate.  I recommended that he continue with glatiramer acetate.  Recommend repeat MRI imaging in about 8-9 months.     If abdominal wall pain becomes a recurrent issue in the future despite unremarkable GI workup, could consider pain to be neuropathic in origin and trial duloxetine. However, at this time, it appears well controlled.     Plan:   -Continue glatiramer acetate  - Scheduled MRI brain, C and T spine 6/2024  - Continue vitamin D supplement  - Follow-up in 1 year    Heaven Hernandez MD  PGY-3 Neurology Resident     Patient was seen and discussed with neurology attending, Dr. Luna

## 2023-11-09 ENCOUNTER — LAB REQUISITION (OUTPATIENT)
Dept: LAB | Facility: CLINIC | Age: 53
End: 2023-11-09

## 2023-11-09 DIAGNOSIS — I10 ESSENTIAL (PRIMARY) HYPERTENSION: ICD-10-CM

## 2023-11-09 DIAGNOSIS — N40.1 BENIGN PROSTATIC HYPERPLASIA WITH LOWER URINARY TRACT SYMPTOMS: ICD-10-CM

## 2023-11-09 PROCEDURE — 80053 COMPREHEN METABOLIC PANEL: CPT | Performed by: FAMILY MEDICINE

## 2023-11-09 PROCEDURE — 80061 LIPID PANEL: CPT | Performed by: FAMILY MEDICINE

## 2023-11-09 PROCEDURE — G0103 PSA SCREENING: HCPCS | Performed by: FAMILY MEDICINE

## 2023-11-10 LAB
ALBUMIN SERPL BCG-MCNC: 4.7 G/DL (ref 3.5–5.2)
ALP SERPL-CCNC: 94 U/L (ref 40–129)
ALT SERPL W P-5'-P-CCNC: 55 U/L (ref 0–70)
ANION GAP SERPL CALCULATED.3IONS-SCNC: 15 MMOL/L (ref 7–15)
AST SERPL W P-5'-P-CCNC: 31 U/L (ref 0–45)
BILIRUB SERPL-MCNC: 0.4 MG/DL
BUN SERPL-MCNC: 14.8 MG/DL (ref 6–20)
CALCIUM SERPL-MCNC: 10 MG/DL (ref 8.6–10)
CHLORIDE SERPL-SCNC: 100 MMOL/L (ref 98–107)
CHOLEST SERPL-MCNC: 186 MG/DL
CREAT SERPL-MCNC: 0.98 MG/DL (ref 0.67–1.17)
DEPRECATED HCO3 PLAS-SCNC: 26 MMOL/L (ref 22–29)
EGFRCR SERPLBLD CKD-EPI 2021: >90 ML/MIN/1.73M2
GLUCOSE SERPL-MCNC: 92 MG/DL (ref 70–99)
HDLC SERPL-MCNC: 49 MG/DL
LDLC SERPL CALC-MCNC: 114 MG/DL
NONHDLC SERPL-MCNC: 137 MG/DL
POTASSIUM SERPL-SCNC: 4.3 MMOL/L (ref 3.4–5.3)
PROT SERPL-MCNC: 7.4 G/DL (ref 6.4–8.3)
PSA SERPL DL<=0.01 NG/ML-MCNC: 0.48 NG/ML (ref 0–3.5)
SODIUM SERPL-SCNC: 141 MMOL/L (ref 135–145)
TRIGL SERPL-MCNC: 117 MG/DL

## 2024-03-01 ENCOUNTER — TELEPHONE (OUTPATIENT)
Dept: NEUROLOGY | Facility: CLINIC | Age: 54
End: 2024-03-01
Payer: COMMERCIAL

## 2024-03-01 DIAGNOSIS — G35 MS (MULTIPLE SCLEROSIS) (H): ICD-10-CM

## 2024-03-01 RX ORDER — GLATIRAMER 40 MG/ML
40 INJECTION, SOLUTION SUBCUTANEOUS
Qty: 12 ML | Refills: 2 | Status: SHIPPED | OUTPATIENT
Start: 2024-03-01 | End: 2024-03-18

## 2024-03-01 NOTE — TELEPHONE ENCOUNTER
LOV: 10/30    NOV: 1 year from last appointment    Per LOV note:   Plan:   -Continue glatiramer acetate  - Scheduled MRI brain, C and T spine 6/2024  - Continue vitamin D supplement  - Follow-up in 1 year    Refill:   Signed Prescriptions:                        Disp   Refills    glatiramer acetate 40 MG/ML injection      12 mL  2        Sig: Inject 40 mg Subcutaneous three times a week  Authorizing Provider: CRISTOPHER ALTAMIRANO  Ordering User: ANDRESSA RUBIO RN, BSN  [facility] Neurology

## 2024-03-01 NOTE — TELEPHONE ENCOUNTER
[facility] Call Center    Phone Message    May a detailed message be left on voicemail: no     Reason for Call: Medication Refill Request    Has the patient contacted the pharmacy for the refill? Yes   Name of medication being requested: glatiramer acetate 40 MG/ML injection  Provider who prescribed the medication: Radha Luna  Pharmacy: [facility]  [address]Y [address]  Date medication is needed: JB Blanco from [facility] pharmacy is requesting a refill for the pt for the medication listed above.         Action Taken: Message routed to:  [facility] ([facility] Neurology     Travel Screening: Not Applicable

## 2024-04-26 ENCOUNTER — LAB REQUISITION (OUTPATIENT)
Dept: LAB | Facility: CLINIC | Age: 54
End: 2024-04-26

## 2024-04-26 DIAGNOSIS — I10 ESSENTIAL (PRIMARY) HYPERTENSION: ICD-10-CM

## 2024-04-26 DIAGNOSIS — Z01.818 ENCOUNTER FOR OTHER PREPROCEDURAL EXAMINATION: ICD-10-CM

## 2024-04-26 LAB
ERYTHROCYTE [DISTWIDTH] IN BLOOD BY AUTOMATED COUNT: 12.5 % (ref 10–15)
HCT VFR BLD AUTO: 42.4 % (ref 40–53)
HGB BLD-MCNC: 15.1 G/DL (ref 13.3–17.7)
MCH RBC QN AUTO: 32.3 PG (ref 26.5–33)
MCHC RBC AUTO-ENTMCNC: 35.6 G/DL (ref 31.5–36.5)
MCV RBC AUTO: 91 FL (ref 78–100)
PLATELET # BLD AUTO: 290 10E3/UL (ref 150–450)
RBC # BLD AUTO: 4.68 10E6/UL (ref 4.4–5.9)
WBC # BLD AUTO: 7.6 10E3/UL (ref 4–11)

## 2024-04-26 PROCEDURE — 80048 BASIC METABOLIC PNL TOTAL CA: CPT | Performed by: FAMILY MEDICINE

## 2024-04-26 PROCEDURE — 85027 COMPLETE CBC AUTOMATED: CPT | Performed by: FAMILY MEDICINE

## 2024-04-27 LAB
ANION GAP SERPL CALCULATED.3IONS-SCNC: 14 MMOL/L (ref 7–15)
BUN SERPL-MCNC: 22.8 MG/DL (ref 6–20)
CALCIUM SERPL-MCNC: 9.4 MG/DL (ref 8.6–10)
CHLORIDE SERPL-SCNC: 103 MMOL/L (ref 98–107)
CREAT SERPL-MCNC: 1.09 MG/DL (ref 0.67–1.17)
DEPRECATED HCO3 PLAS-SCNC: 26 MMOL/L (ref 22–29)
EGFRCR SERPLBLD CKD-EPI 2021: 81 ML/MIN/1.73M2
GLUCOSE SERPL-MCNC: 130 MG/DL (ref 70–99)
POTASSIUM SERPL-SCNC: 4.3 MMOL/L (ref 3.4–5.3)
SODIUM SERPL-SCNC: 143 MMOL/L (ref 135–145)

## 2024-06-08 ENCOUNTER — HEALTH MAINTENANCE LETTER (OUTPATIENT)
Age: 54
End: 2024-06-08

## 2024-07-10 ENCOUNTER — TELEPHONE (OUTPATIENT)
Dept: NEUROLOGY | Facility: CLINIC | Age: 54
End: 2024-07-10
Payer: COMMERCIAL

## 2024-07-10 DIAGNOSIS — G35 MS (MULTIPLE SCLEROSIS) (H): ICD-10-CM

## 2024-07-10 RX ORDER — GLATIRAMER 40 MG/ML
40 INJECTION, SOLUTION SUBCUTANEOUS
Qty: 12 ML | Refills: 2 | Status: SHIPPED | OUTPATIENT
Start: 2024-07-10

## 2024-07-10 NOTE — TELEPHONE ENCOUNTER
LOV: 10/30/23      Per LOV note:   Plan:   -Continue glatiramer acetate    Refill:   Signed Prescriptions:                        Disp   Refills    glatiramer acetate 40 MG/ML injection      12 mL  2        Sig: Inject 40 mg subcutaneously three times a week  Authorizing Provider: CRISTOPHER ALTAMIRANO  Ordering User: ANDRESSA RUBIO RN, BSN  [facility]

## 2024-07-10 NOTE — TELEPHONE ENCOUNTER
[facility] Call Center    Phone Message    May a detailed message be left on voicemail: no     Reason for Call: Medication Refill Request    Has the patient contacted the pharmacy for the refill? Yes     Name of medication being requested:   glatiramer acetate 40 MG/ML injection    Provider who prescribed the medication: Radha Luna    Pharmacy:   [facility]     Date medication is needed: 7/22    Action Taken: Other: Neurology    Travel Screening: Not Applicable

## 2024-10-01 ENCOUNTER — LAB REQUISITION (OUTPATIENT)
Dept: LAB | Facility: CLINIC | Age: 54
End: 2024-10-01

## 2024-10-01 DIAGNOSIS — I10 ESSENTIAL (PRIMARY) HYPERTENSION: ICD-10-CM

## 2024-10-01 DIAGNOSIS — Z00.00 ENCOUNTER FOR GENERAL ADULT MEDICAL EXAMINATION WITHOUT ABNORMAL FINDINGS: ICD-10-CM

## 2024-10-01 DIAGNOSIS — E03.9 HYPOTHYROIDISM, UNSPECIFIED: ICD-10-CM

## 2024-10-01 PROCEDURE — 80053 COMPREHEN METABOLIC PANEL: CPT | Performed by: FAMILY MEDICINE

## 2024-10-01 PROCEDURE — 84443 ASSAY THYROID STIM HORMONE: CPT | Performed by: FAMILY MEDICINE

## 2024-10-01 PROCEDURE — 80061 LIPID PANEL: CPT | Performed by: FAMILY MEDICINE

## 2024-10-02 LAB
ALBUMIN SERPL BCG-MCNC: 4.7 G/DL (ref 3.5–5.2)
ALP SERPL-CCNC: 97 U/L (ref 40–150)
ALT SERPL W P-5'-P-CCNC: 33 U/L (ref 0–70)
ANION GAP SERPL CALCULATED.3IONS-SCNC: 12 MMOL/L (ref 7–15)
AST SERPL W P-5'-P-CCNC: 24 U/L (ref 0–45)
BILIRUB SERPL-MCNC: 0.4 MG/DL
BUN SERPL-MCNC: 17.4 MG/DL (ref 6–20)
CALCIUM SERPL-MCNC: 9.6 MG/DL (ref 8.8–10.4)
CHLORIDE SERPL-SCNC: 100 MMOL/L (ref 98–107)
CHOLEST SERPL-MCNC: 191 MG/DL
CREAT SERPL-MCNC: 0.93 MG/DL (ref 0.67–1.17)
EGFRCR SERPLBLD CKD-EPI 2021: >90 ML/MIN/1.73M2
FASTING STATUS PATIENT QL REPORTED: ABNORMAL
FASTING STATUS PATIENT QL REPORTED: NORMAL
GLUCOSE SERPL-MCNC: 93 MG/DL (ref 70–99)
HCO3 SERPL-SCNC: 29 MMOL/L (ref 22–29)
HDLC SERPL-MCNC: 63 MG/DL
LDLC SERPL CALC-MCNC: 114 MG/DL
NONHDLC SERPL-MCNC: 128 MG/DL
POTASSIUM SERPL-SCNC: 4.2 MMOL/L (ref 3.4–5.3)
PROT SERPL-MCNC: 7.4 G/DL (ref 6.4–8.3)
SODIUM SERPL-SCNC: 141 MMOL/L (ref 135–145)
TRIGL SERPL-MCNC: 70 MG/DL
TSH SERPL DL<=0.005 MIU/L-ACNC: 3 UIU/ML (ref 0.3–4.2)

## 2024-11-01 ENCOUNTER — TRANSFERRED RECORDS (OUTPATIENT)
Dept: HEALTH INFORMATION MANAGEMENT | Facility: CLINIC | Age: 54
End: 2024-11-01
Payer: COMMERCIAL

## 2024-11-20 DIAGNOSIS — G35 MS (MULTIPLE SCLEROSIS) (H): ICD-10-CM

## 2024-11-20 RX ORDER — GLATIRAMER 40 MG/ML
40 INJECTION, SOLUTION SUBCUTANEOUS
Qty: 12 ML | Refills: 2 | Status: SHIPPED | OUTPATIENT
Start: 2024-11-20

## 2024-11-20 NOTE — TELEPHONE ENCOUNTER
Pending Prescriptions:                       Disp   Refills    glatiramer acetate 40 MG/ML injection     12 mL  2            Sig: Inject 40 mg subcutaneously three times a week.        Future Appointments    Encounter Information   Provider Department Center   12/18/2024 10:30 AM (Arrive by 10:15 AM) Radha Luna MD [facility] Multiple Sclerosis [facility]

## 2024-11-21 ENCOUNTER — LAB REQUISITION (OUTPATIENT)
Dept: LAB | Facility: CLINIC | Age: 54
End: 2024-11-21

## 2024-11-21 DIAGNOSIS — E03.9 HYPOTHYROIDISM, UNSPECIFIED: ICD-10-CM

## 2024-11-21 DIAGNOSIS — E66.01 MORBID (SEVERE) OBESITY DUE TO EXCESS CALORIES (H): ICD-10-CM

## 2024-11-21 LAB
CORTIS SERPL-MCNC: 6.5 UG/DL
EST. AVERAGE GLUCOSE BLD GHB EST-MCNC: 120 MG/DL
HBA1C MFR BLD: 5.8 %
PROLACTIN SERPL 3RD IS-MCNC: 8 NG/ML (ref 4–15)
T3 SERPL-MCNC: 97 NG/DL (ref 85–202)
T4 FREE SERPL-MCNC: 1.16 NG/DL (ref 0.9–1.7)

## 2024-11-21 PROCEDURE — 84439 ASSAY OF FREE THYROXINE: CPT | Performed by: FAMILY MEDICINE

## 2024-11-21 PROCEDURE — 84146 ASSAY OF PROLACTIN: CPT | Performed by: FAMILY MEDICINE

## 2024-11-21 PROCEDURE — 82533 TOTAL CORTISOL: CPT | Performed by: FAMILY MEDICINE

## 2024-11-21 PROCEDURE — 84480 ASSAY TRIIODOTHYRONINE (T3): CPT | Performed by: FAMILY MEDICINE

## 2024-11-21 PROCEDURE — 83036 HEMOGLOBIN GLYCOSYLATED A1C: CPT | Performed by: FAMILY MEDICINE

## 2024-11-24 LAB
TESTOST FREE SERPL-MCNC: 4.55 NG/DL
TESTOST SERPL-MCNC: 248 NG/DL (ref 240–950)

## 2024-12-18 ENCOUNTER — OFFICE VISIT (OUTPATIENT)
Dept: NEUROLOGY | Facility: CLINIC | Age: 54
End: 2024-12-18
Attending: PSYCHIATRY & NEUROLOGY
Payer: COMMERCIAL

## 2024-12-18 ENCOUNTER — MEDICAL CORRESPONDENCE (OUTPATIENT)
Dept: HEALTH INFORMATION MANAGEMENT | Facility: CLINIC | Age: 54
End: 2024-12-18

## 2024-12-18 VITALS
OXYGEN SATURATION: 95 % | WEIGHT: 269.3 LBS | DIASTOLIC BLOOD PRESSURE: 112 MMHG | BODY MASS INDEX: 39.89 KG/M2 | HEIGHT: 69 IN | SYSTOLIC BLOOD PRESSURE: 176 MMHG | HEART RATE: 66 BPM

## 2024-12-18 DIAGNOSIS — G35 MS (MULTIPLE SCLEROSIS) (H): ICD-10-CM

## 2024-12-18 PROCEDURE — 99214 OFFICE O/P EST MOD 30 MIN: CPT | Performed by: PSYCHIATRY & NEUROLOGY

## 2024-12-18 RX ORDER — VITAMIN B COMPLEX
2000 TABLET ORAL
COMMUNITY

## 2024-12-18 RX ORDER — LEVOTHYROXINE SODIUM 25 UG/1
25 TABLET ORAL
COMMUNITY

## 2024-12-18 RX ORDER — TADALAFIL 5 MG/1
10 TABLET ORAL
COMMUNITY

## 2024-12-18 RX ORDER — GLATIRAMER 40 MG/ML
40 INJECTION, SOLUTION SUBCUTANEOUS
Qty: 12 ML | Refills: 11 | Status: SHIPPED | OUTPATIENT
Start: 2024-12-18

## 2024-12-18 ASSESSMENT — PAIN SCALES - GENERAL: PAINLEVEL_OUTOF10: NO PAIN (0)

## 2024-12-18 NOTE — LETTER
12/18/2024       RE: Mahendra Dixon  [address]  [place] [address]     Dear Colleague,    Thank you for referring your patient, Mahendra Dixon, to the [facility] MULT[facility]LE [facility]OS[facility] CLINIC [place] [facility]. Please see a copy of my visit note below.    Date of Service: 12/18/2024    [facility] Neurology   MS Clinic Follow-up     Subjective: 54-year-old man with GERD, hypertension, and multiple sclerosis who presents in follow-up.    No discrete new symptoms     Did have right hip replacement   Worked with pt   Now back to exercising   Can go 2-3 miles on treadmill   Doing some weight training     Notes that he has been doing mohsen for one year with 10 pound weight loss in the beginning   Has briefly discussed with pcp     Struggling with some GI symptoms and is working with a specialist     Was switched to glatopa by specialty pharmacy   Tolerating ok   No side effects       Disease onset: Age 48, right-sided MS hug with right leg incoordination  Last relapse: Same    Prior DMDs:   Glatiramer acetate 40 mg 3 times per week; 2018 - present      No Known Allergies    Current Outpatient Medications   Medication Sig Dispense Refill     glatiramer acetate 40 MG/ML injection Inject 40 mg subcutaneously three times a week. 12 mL 2     levothyroxine (SYNTHROID/LEVOTHROID) 25 MCG tablet Take 25 mcg by mouth every morning (before breakfast). 20-12.5mg       Multiple Vitamin (MULTI-VITAMIN DAILY PO) Take by mouth daily       omeprazole (PRILOSEC) 20 MG DR capsule Take 20 mg by mouth daily       tadalafil (CIALIS) 5 MG tablet 10 mg.       tamsulosin (FLOMAX) 0.4 MG capsule Take 0.4 mg by mouth daily       Vitamin D3 (CHOLECALCIFEROL) 25 mcg (1000 units) tablet Take 2,000 Units by mouth.       lisinopril-hydrochlorothiazide (PRINZIDE,ZESTORETIC) 10-12.5 MG per tablet Take 1 tablet by mouth daily (Needs follow-up appointment for this medication) 90 tablet  "3     Omega-3 Fatty Acids (FISH OIL PO) Take by mouth daily       No current facility-administered medications for this visit.        Past medical, surgical, social and family history was personally reviewed. Pertinent details noted above.     Physical Examination:   BP (!) 176/112 (BP Location: Left arm, Patient Position: Sitting, Cuff Size: Adult Large)   Pulse 66   Ht 1.76 m (5' 9.29\")   Wt 122.2 kg (269 lb 4.8 oz)   SpO2 95%   BMI 39.44 kg/m      General: no acute distress  Cranial nerves:   VFFC  PERRL  EOM full w/no BRANDYN   Face symmetric  Hearing intact  No dysarthria   Motor:   Tone is normal   Bulk is normal     R L  Deltoid  5 5  Biceps  5 5  Triceps 5 5  Wrist ext 5 5  Finger ext 5 5  Finger abd 5 5    Hip flexion 5 5  Knee flexion 5 5  Knee ext 5 5  Ankle d/f 5 5    Reflexes: 2+ and symmetric throughout, babinski absent bilaterally  Sensory: vibration is minimally reduced in the toes, JPS normal in the toes   Romberg is absent  Coordination: no ataxia or dysmetria  Gait: normal base and stride, tandem gait is intact, able to balance on one foot x 10 seconds, but difficult hopping     Tests/Imaging:   CSF + 4 ocb, normal IgG index    KEM virus Ab positive  Vitamin D 55    MRI brain   11/2018 - personal review, low lesion burden, single periventricular, couple small juxtacortical, few small deep white matter, gd-   2/2019 - no new lesions, gd-   10/2022-no new lesions,gd-  11/2024-no new lesions, gd-    MRI cervical spine   11/2018 - personal review, ant c1 lesion, c5-6, large lateral cord lesion c7-t1 gd+   2/2019 - no new lesions, gd-   4/2022 - no new lesions, gd-   11/2024-no new lesions, gd-    MRI thoracic spine   2/2019 - larger mid lower thoracic spine lesion, smaller upper thoracic and lower thoracic spine lesions, gd-   4/2022 - no new lesions, gd-   11/2024-no new lesions, gd-    Assessment: 54-year-old man with relapsing remitting multiple sclerosis who remains clinically and radiologically " stable.     He is struggling a bit with one leg activities, though able to maintain his balance. I suspect that this is related to relative deconditioning in the setting of his recent hip replacement     Plan:   -Continue glatiramer acetate  - Follow-up in 1 year    Note was completed with the assistance of Dragon Fluency software which can often result in accidental word substitutions.     A total of 30 minutes on the date of service were spent in the care of this patient.   Radha Luna MD on 12/18/2024 at 10:55 AM          Again, thank you for allowing me to participate in the care of your patient.      Sincerely,    Radha Luna MD

## 2024-12-18 NOTE — PROGRESS NOTES
Date of Service: 12/18/2024    [facility] Neurology   MS Clinic Follow-up     Subjective: 54-year-old man with GERD, hypertension, and multiple sclerosis who presents in follow-up.    No discrete new symptoms     Did have right hip replacement   Worked with pt   Now back to exercising   Can go 2-3 miles on treadmill   Doing some weight training     Notes that he has been doing mohsen for one year with 10 pound weight loss in the beginning   Has briefly discussed with pcp     Struggling with some GI symptoms and is working with a specialist     Was switched to glatopa by specialty pharmacy   Tolerating ok   No side effects       Disease onset: Age 48, right-sided MS hug with right leg incoordination  Last relapse: Same    Prior DMDs:   Glatiramer acetate 40 mg 3 times per week; 2018 - present      No Known Allergies    Current Outpatient Medications   Medication Sig Dispense Refill    glatiramer acetate 40 MG/ML injection Inject 40 mg subcutaneously three times a week. 12 mL 2    levothyroxine (SYNTHROID/LEVOTHROID) 25 MCG tablet Take 25 mcg by mouth every morning (before breakfast). 20-12.5mg      Multiple Vitamin (MULTI-VITAMIN DAILY PO) Take by mouth daily      omeprazole (PRILOSEC) 20 MG DR capsule Take 20 mg by mouth daily      tadalafil (CIALIS) 5 MG tablet 10 mg.      tamsulosin (FLOMAX) 0.4 MG capsule Take 0.4 mg by mouth daily      Vitamin D3 (CHOLECALCIFEROL) 25 mcg (1000 units) tablet Take 2,000 Units by mouth.      lisinopril-hydrochlorothiazide (PRINZIDE,ZESTORETIC) 10-12.5 MG per tablet Take 1 tablet by mouth daily (Needs follow-up appointment for this medication) 90 tablet 3    Omega-3 Fatty Acids (FISH OIL PO) Take by mouth daily       No current facility-administered medications for this visit.        Past medical, surgical, social and family history was personally reviewed. Pertinent details noted above.     Physical Examination:   BP (!) 176/112 (BP Location: Left arm, Patient Position: Sitting, Cuff  "Size: Adult Large)   Pulse 66   Ht 1.76 m (5' 9.29\")   Wt 122.2 kg (269 lb 4.8 oz)   SpO2 95%   BMI 39.44 kg/m      General: no acute distress  Cranial nerves:   VFFC  PERRL  EOM full w/no BRANDYN   Face symmetric  Hearing intact  No dysarthria   Motor:   Tone is normal   Bulk is normal     R L  Deltoid  5 5  Biceps  5 5  Triceps 5 5  Wrist ext 5 5  Finger ext 5 5  Finger abd 5 5    Hip flexion 5 5  Knee flexion 5 5  Knee ext 5 5  Ankle d/f 5 5    Reflexes: 2+ and symmetric throughout, babinski absent bilaterally  Sensory: vibration is minimally reduced in the toes, JPS normal in the toes   Romberg is absent  Coordination: no ataxia or dysmetria  Gait: normal base and stride, tandem gait is intact, able to balance on one foot x 10 seconds, but difficult hopping     Tests/Imaging:   CSF + 4 ocb, normal IgG index    KEM virus Ab positive  Vitamin D 55    MRI brain   11/2018 - personal review, low lesion burden, single periventricular, couple small juxtacortical, few small deep white matter, gd-   2/2019 - no new lesions, gd-   10/2022-no new lesions,gd-  11/2024-no new lesions, gd-    MRI cervical spine   11/2018 - personal review, ant c1 lesion, c5-6, large lateral cord lesion c7-t1 gd+   2/2019 - no new lesions, gd-   4/2022 - no new lesions, gd-   11/2024-no new lesions, gd-    MRI thoracic spine   2/2019 - larger mid lower thoracic spine lesion, smaller upper thoracic and lower thoracic spine lesions, gd-   4/2022 - no new lesions, gd-   11/2024-no new lesions, gd-    Assessment: 54-year-old man with relapsing remitting multiple sclerosis who remains clinically and radiologically stable.     He is struggling a bit with one leg activities, though able to maintain his balance. I suspect that this is related to relative deconditioning in the setting of his recent hip replacement     Plan:   -Continue glatiramer acetate  - Follow-up in 1 year    Note was completed with the assistance of Dragon Fluency software which " can often result in accidental word substitutions.     A total of 30 minutes on the date of service were spent in the care of this patient.   Radha Luna MD on 12/18/2024 at 10:55 AM

## 2024-12-18 NOTE — NURSING NOTE
Chief Complaint   Patient presents with    MS    RECHECK     Annual follow up      Vitals were taken and medications were reconciled.   Thomas Kennedy, EMT  10:40 AM

## 2024-12-18 NOTE — PATIENT INSTRUCTIONS
Continue glatopa     Continue to work on regaining strength since your surgery     Follow up with me in 1 year

## 2025-06-15 ENCOUNTER — HEALTH MAINTENANCE LETTER (OUTPATIENT)
Age: 55
End: 2025-06-15

## 2025-06-19 ENCOUNTER — LAB REQUISITION (OUTPATIENT)
Dept: LAB | Facility: CLINIC | Age: 55
End: 2025-06-19

## 2025-06-19 DIAGNOSIS — I10 ESSENTIAL (PRIMARY) HYPERTENSION: ICD-10-CM

## 2025-06-19 DIAGNOSIS — E66.01 MORBID (SEVERE) OBESITY DUE TO EXCESS CALORIES (H): ICD-10-CM

## 2025-06-19 DIAGNOSIS — Z12.5 ENCOUNTER FOR SCREENING FOR MALIGNANT NEOPLASM OF PROSTATE: ICD-10-CM

## 2025-06-19 DIAGNOSIS — R73.09 OTHER ABNORMAL GLUCOSE: ICD-10-CM

## 2025-06-19 LAB
ANION GAP SERPL CALCULATED.3IONS-SCNC: 14 MMOL/L (ref 7–15)
BUN SERPL-MCNC: 22.9 MG/DL (ref 6–20)
CALCIUM SERPL-MCNC: 9.3 MG/DL (ref 8.8–10.4)
CHLORIDE SERPL-SCNC: 103 MMOL/L (ref 98–107)
CREAT SERPL-MCNC: 1.1 MG/DL (ref 0.67–1.17)
EGFRCR SERPLBLD CKD-EPI 2021: 80 ML/MIN/1.73M2
EST. AVERAGE GLUCOSE BLD GHB EST-MCNC: 108 MG/DL
GLUCOSE SERPL-MCNC: 109 MG/DL (ref 70–99)
HBA1C MFR BLD: 5.4 %
HCO3 SERPL-SCNC: 25 MMOL/L (ref 22–29)
POTASSIUM SERPL-SCNC: 3.9 MMOL/L (ref 3.4–5.3)
PSA SERPL DL<=0.01 NG/ML-MCNC: 0.68 NG/ML (ref 0–3.5)
SHBG SERPL-SCNC: 30 NMOL/L (ref 11–80)
SODIUM SERPL-SCNC: 142 MMOL/L (ref 135–145)

## 2025-06-19 PROCEDURE — G0103 PSA SCREENING: HCPCS | Performed by: FAMILY MEDICINE

## 2025-06-19 PROCEDURE — 83036 HEMOGLOBIN GLYCOSYLATED A1C: CPT | Performed by: FAMILY MEDICINE

## 2025-06-19 PROCEDURE — 84270 ASSAY OF SEX HORMONE GLOBUL: CPT | Performed by: FAMILY MEDICINE

## 2025-06-19 PROCEDURE — 80048 BASIC METABOLIC PNL TOTAL CA: CPT | Performed by: FAMILY MEDICINE

## 2025-06-22 LAB
TESTOST FREE SERPL-MCNC: 3.76 NG/DL
TESTOST SERPL-MCNC: 188 NG/DL (ref 240–950)